# Patient Record
Sex: MALE | Race: WHITE | Employment: FULL TIME | URBAN - NONMETROPOLITAN AREA
[De-identification: names, ages, dates, MRNs, and addresses within clinical notes are randomized per-mention and may not be internally consistent; named-entity substitution may affect disease eponyms.]

---

## 2023-03-01 ENCOUNTER — OFFICE VISIT (OUTPATIENT)
Dept: FAMILY MEDICINE CLINIC | Age: 33
End: 2023-03-01
Payer: COMMERCIAL

## 2023-03-01 ENCOUNTER — TELEPHONE (OUTPATIENT)
Dept: FAMILY MEDICINE CLINIC | Age: 33
End: 2023-03-01

## 2023-03-01 VITALS
WEIGHT: 257.4 LBS | HEART RATE: 95 BPM | DIASTOLIC BLOOD PRESSURE: 92 MMHG | TEMPERATURE: 98.9 F | BODY MASS INDEX: 36.85 KG/M2 | OXYGEN SATURATION: 98 % | HEIGHT: 70 IN | SYSTOLIC BLOOD PRESSURE: 132 MMHG

## 2023-03-01 DIAGNOSIS — M25.40 JOINT SWELLING: ICD-10-CM

## 2023-03-01 DIAGNOSIS — I10 PRIMARY HYPERTENSION: ICD-10-CM

## 2023-03-01 DIAGNOSIS — R51.9 ACUTE NONINTRACTABLE HEADACHE, UNSPECIFIED HEADACHE TYPE: ICD-10-CM

## 2023-03-01 DIAGNOSIS — I10 PRIMARY HYPERTENSION: Primary | ICD-10-CM

## 2023-03-01 DIAGNOSIS — L60.8 PITTING OF NAILS: ICD-10-CM

## 2023-03-01 PROBLEM — M77.9 INFLAMMATION AROUND JOINT: Status: ACTIVE | Noted: 2023-03-01

## 2023-03-01 PROBLEM — G43.909 MIGRAINES: Status: ACTIVE | Noted: 2023-03-01

## 2023-03-01 LAB
ALBUMIN SERPL-MCNC: 4.7 G/DL (ref 3.5–4.6)
ALP BLD-CCNC: 95 U/L (ref 35–104)
ALT SERPL-CCNC: 39 U/L (ref 0–41)
ANION GAP SERPL CALCULATED.3IONS-SCNC: 18 MEQ/L (ref 9–15)
AST SERPL-CCNC: 30 U/L (ref 0–40)
BASOPHILS ABSOLUTE: 0 K/UL (ref 0–0.2)
BASOPHILS RELATIVE PERCENT: 0.3 %
BILIRUB SERPL-MCNC: 0.4 MG/DL (ref 0.2–0.7)
BUN BLDV-MCNC: 8 MG/DL (ref 6–20)
CALCIUM SERPL-MCNC: 9.9 MG/DL (ref 8.5–9.9)
CHLORIDE BLD-SCNC: 103 MEQ/L (ref 95–107)
CO2: 22 MEQ/L (ref 20–31)
CREAT SERPL-MCNC: 0.77 MG/DL (ref 0.7–1.2)
EOSINOPHILS ABSOLUTE: 0.2 K/UL (ref 0–0.7)
EOSINOPHILS RELATIVE PERCENT: 3.5 %
GFR SERPL CREATININE-BSD FRML MDRD: >60 ML/MIN/{1.73_M2}
GLOBULIN: 3.4 G/DL (ref 2.3–3.5)
GLUCOSE BLD-MCNC: 88 MG/DL (ref 70–99)
HCT VFR BLD CALC: 45.8 % (ref 42–52)
HEMOGLOBIN: 15.5 G/DL (ref 14–18)
LYMPHOCYTES ABSOLUTE: 2 K/UL (ref 1–4.8)
LYMPHOCYTES RELATIVE PERCENT: 29.2 %
MCH RBC QN AUTO: 29.3 PG (ref 27–31.3)
MCHC RBC AUTO-ENTMCNC: 33.9 % (ref 33–37)
MCV RBC AUTO: 86.3 FL (ref 79–92.2)
MONOCYTES ABSOLUTE: 0.5 K/UL (ref 0.2–0.8)
MONOCYTES RELATIVE PERCENT: 6.7 %
NEUTROPHILS ABSOLUTE: 4 K/UL (ref 1.4–6.5)
NEUTROPHILS RELATIVE PERCENT: 60.3 %
PDW BLD-RTO: 13.8 % (ref 11.5–14.5)
PLATELET # BLD: 326 K/UL (ref 130–400)
POTASSIUM SERPL-SCNC: 3.9 MEQ/L (ref 3.4–4.9)
RBC # BLD: 5.3 M/UL (ref 4.7–6.1)
SEDIMENTATION RATE, ERYTHROCYTE: 15 MM (ref 0–10)
SODIUM BLD-SCNC: 143 MEQ/L (ref 135–144)
TOTAL PROTEIN: 8.1 G/DL (ref 6.3–8)
TSH REFLEX: 1.19 UIU/ML (ref 0.44–3.86)
URIC ACID, SERUM: 7.1 MG/DL (ref 3.4–7)
WBC # BLD: 6.7 K/UL (ref 4.8–10.8)

## 2023-03-01 PROCEDURE — 3075F SYST BP GE 130 - 139MM HG: CPT | Performed by: FAMILY MEDICINE

## 2023-03-01 PROCEDURE — 99204 OFFICE O/P NEW MOD 45 MIN: CPT | Performed by: FAMILY MEDICINE

## 2023-03-01 PROCEDURE — 3080F DIAST BP >= 90 MM HG: CPT | Performed by: FAMILY MEDICINE

## 2023-03-01 RX ORDER — MULTIVITAMIN/IRON/FOLIC ACID 18MG-0.4MG
TABLET ORAL
COMMUNITY

## 2023-03-01 RX ORDER — MELATONIN 3 MG
TABLET ORAL
COMMUNITY

## 2023-03-01 RX ORDER — METOPROLOL SUCCINATE 50 MG/1
50 TABLET, EXTENDED RELEASE ORAL NIGHTLY
Qty: 30 TABLET | Refills: 5 | Status: SHIPPED | OUTPATIENT
Start: 2023-03-01

## 2023-03-01 SDOH — ECONOMIC STABILITY: HOUSING INSECURITY
IN THE LAST 12 MONTHS, WAS THERE A TIME WHEN YOU DID NOT HAVE A STEADY PLACE TO SLEEP OR SLEPT IN A SHELTER (INCLUDING NOW)?: NO

## 2023-03-01 SDOH — ECONOMIC STABILITY: INCOME INSECURITY: HOW HARD IS IT FOR YOU TO PAY FOR THE VERY BASICS LIKE FOOD, HOUSING, MEDICAL CARE, AND HEATING?: NOT HARD AT ALL

## 2023-03-01 SDOH — ECONOMIC STABILITY: FOOD INSECURITY: WITHIN THE PAST 12 MONTHS, THE FOOD YOU BOUGHT JUST DIDN'T LAST AND YOU DIDN'T HAVE MONEY TO GET MORE.: NEVER TRUE

## 2023-03-01 SDOH — ECONOMIC STABILITY: FOOD INSECURITY: WITHIN THE PAST 12 MONTHS, YOU WORRIED THAT YOUR FOOD WOULD RUN OUT BEFORE YOU GOT MONEY TO BUY MORE.: NEVER TRUE

## 2023-03-01 ASSESSMENT — PATIENT HEALTH QUESTIONNAIRE - PHQ9
SUM OF ALL RESPONSES TO PHQ QUESTIONS 1-9: 0
1. LITTLE INTEREST OR PLEASURE IN DOING THINGS: 0
SUM OF ALL RESPONSES TO PHQ QUESTIONS 1-9: 0
SUM OF ALL RESPONSES TO PHQ QUESTIONS 1-9: 0
2. FEELING DOWN, DEPRESSED OR HOPELESS: 0
SUM OF ALL RESPONSES TO PHQ QUESTIONS 1-9: 0
SUM OF ALL RESPONSES TO PHQ9 QUESTIONS 1 & 2: 0

## 2023-03-01 ASSESSMENT — ENCOUNTER SYMPTOMS
VOMITING: 0
ABDOMINAL PAIN: 0
NAUSEA: 1
COLOR CHANGE: 1
PHOTOPHOBIA: 0
CONSTIPATION: 0
ABDOMINAL DISTENTION: 0
CHEST TIGHTNESS: 0
SHORTNESS OF BREATH: 0

## 2023-03-01 NOTE — PATIENT INSTRUCTIONS
Patient will bring home blood pressure monitor to next appointment to verify accuracy. Labs ordered today looking for medical conditions causative for elevated blood pressure    Initiate medication today for elevated blood pressure as this has been historically present off and on for the last 3 years. We will discuss weight loss at next appointment. Consider migraine medications after we see the response of headaches to the blood pressure levels. Inflammatory markers and rheumatic disease and psoriatic and gout related labs are being ordered to evaluate pitting nails, swollen joints. Nail changes.     Discussed treatment options at next appointment

## 2023-03-01 NOTE — PROGRESS NOTES
Diagnosis Orders   1. Primary hypertension  Comprehensive Metabolic Panel    CBC with Auto Differential    TSH with Reflex    metoprolol succinate (TOPROL XL) 50 MG extended release tablet      2. Pitting of nails  Sedimentation Rate    CHRISTEN Screen With Reflex    Hla-B27 Antigen      3. Joint swelling  Sedimentation Rate    CHRISTEN Screen With Reflex    Rheumatoid Factor    Uric Acid      4. Acute nonintractable headache, unspecified headache type          Return in about 1 week (around 3/8/2023) for for review of outcome of today's recommendation. Patient Instructions   Patient will bring home blood pressure monitor to next appointment to verify accuracy. Labs ordered today looking for medical conditions causative for elevated blood pressure    Initiate medication today for elevated blood pressure as this has been historically present off and on for the last 3 years. We will discuss weight loss at next appointment. Consider migraine medications after we see the response of headaches to the blood pressure levels. Inflammatory markers and rheumatic disease and psoriatic and gout related labs are being ordered to evaluate pitting nails, swollen joints. Nail changes. Discussed treatment options at next appointment    Subjective:      Patient ID: Ever Avendano is a 28 y.o. male who presents for:  Chief Complaint   Patient presents with    Establish Care     Establish care     Migraine    Joint Pain     With inflammation around knuckles and toes        Bp elevations for a while. Migraines since at least 2019. Tylenol and ibuprofen no help, ice pack some help. Headache is often one sided. Has had blurred vision with them. Sleep usually helps. Does snore and can wake with headache. Over the counter migraine med with sleep that does help, but can occur. Rare alcohol. Coffee 10 oz in morning and some diet coke- 2 during day. Monitoring hydration and taking in water routinely.       Noting swelling in the joints of the fingers ankles and toes. Noting color changes in fingernails and pitting. Current Outpatient Medications on File Prior to Visit   Medication Sig Dispense Refill    Multiple Vitamins-Minerals (CENTRUM ADULTS) TABS       Cetirizine HCl 10 MG CAPS Take 1 capsule by mouth daily      Melatonin 3-10 MG TABS        No current facility-administered medications on file prior to visit. Past Medical History:   Diagnosis Date    Anxiety     Panic Attacks    Headache     Migraines    Hypertension      History reviewed. No pertinent surgical history. Social History     Socioeconomic History    Marital status: Single     Spouse name: Not on file    Number of children: Not on file    Years of education: Not on file    Highest education level: Not on file   Occupational History    Not on file   Tobacco Use    Smoking status: Former    Smokeless tobacco: Never   Vaping Use    Vaping Use: Not on file   Substance and Sexual Activity    Alcohol use: Yes     Comment: On occasion not regularly    Drug use: Never    Sexual activity: Yes     Partners: Male   Other Topics Concern    Not on file   Social History Narrative    Not on file     Social Determinants of Health     Financial Resource Strain: Low Risk     Difficulty of Paying Living Expenses: Not hard at all   Food Insecurity: No Food Insecurity    Worried About Running Out of Food in the Last Year: Never true    [address] in the Last Year: Never true   Transportation Needs: Unknown    Lack of Transportation (Medical): Not on file    Lack of Transportation (Non-Medical):  No   Physical Activity: Not on file   Stress: Not on file   Social Connections: Not on file   Intimate Partner Violence: Not on file   Housing Stability: Unknown    Unable to Pay for Housing in the Last Year: Not on file    Number of Places Lived in the Last Year: Not on file    Unstable Housing in the Last Year: No     Family History   Problem Relation Age of Onset    Asthma Mother     Heart Attack Father     High Blood Pressure Father     Cancer Maternal Grandfather         Skin and SCLC    Heart Attack Maternal Grandfather     Prostate Cancer Maternal Grandfather     Allergy (Severe) Maternal Grandmother         Iodine, shellfish    Cancer Paternal Grandfather         Throat    Stroke Paternal Grandmother     Arthritis Sister         Rheumatoid    Spondylitis Sister         ankylosing    Asthma Sister     Lupus Brother     Mental Retardation Maternal Cousin         Down Syndrome     Allergies:  Seasonal    Review of Systems   Constitutional:  Negative for activity change, appetite change, diaphoresis and unexpected weight change. Eyes:  Negative for photophobia and visual disturbance. Respiratory:  Negative for chest tightness and shortness of breath. No orthopnea   Cardiovascular:  Negative for chest pain, palpitations and leg swelling. Gastrointestinal:  Positive for nausea. Negative for abdominal distention, abdominal pain, constipation and vomiting. Genitourinary:  Negative for flank pain and frequency. Musculoskeletal:  Negative for gait problem and joint swelling. Skin:  Positive for color change. Neurological:  Positive for headaches. Negative for dizziness, weakness and light-headedness. Psychiatric/Behavioral:  Negative for confusion. Objective:   BP (!) 132/92   Pulse 95   Temp 98.9 °F (37.2 °C)   Ht 5' 9.75\" (1.772 m)   Wt 257 lb 6.4 oz (116.8 kg)   SpO2 98%   BMI 37.20 kg/m²     Physical Exam  Vitals reviewed. Constitutional:       General: He is not in acute distress. Appearance: He is well-developed. HENT:      Head: Normocephalic and atraumatic. Right Ear: External ear normal.      Left Ear: External ear normal.      Nose: Nose normal.   Eyes:      General:         Right eye: No discharge. Left eye: No discharge.       Conjunctiva/sclera: Conjunctivae normal.      Pupils: Pupils are equal, round, and reactive to light.   Neck:      Thyroid: No thyromegaly. Cardiovascular:      Rate and Rhythm: Normal rate and regular rhythm. Heart sounds: Normal heart sounds. Pulmonary:      Effort: Pulmonary effort is normal. No respiratory distress. Breath sounds: Normal breath sounds. Abdominal:      General: There is no distension. Musculoskeletal:      Cervical back: Neck supple. Skin:     General: Skin is warm and dry. Comments: Abnormality in the nail discoloration and hyperkeratosis noted in the left hand #3 and 4 and 1, right hand #1 and 2. Pitting noted in all fingernail   Neurological:      Mental Status: He is alert and oriented to person, place, and time. Coordination: Coordination normal.   Psychiatric:         Thought Content: Thought content normal.         Judgment: Judgment normal.       No results found for this visit on 03/01/23. No results found for this or any previous visit (from the past 2016 hour(s)). [] Pt was seen by provider for      Minutes  Counseling and coordination of care was done for all assessment diagnosis listed for today with patient and any family/friend present. More than 50% of this visit was spent coordinating current care, obtaining information for prior records, and counseling for current plan of action. Assessment:       Diagnosis Orders   1. Primary hypertension  Comprehensive Metabolic Panel    CBC with Auto Differential    TSH with Reflex    metoprolol succinate (TOPROL XL) 50 MG extended release tablet      2. Pitting of nails  Sedimentation Rate    CHRISTEN Screen With Reflex    Hla-B27 Antigen      3. Joint swelling  Sedimentation Rate    CHRISTEN Screen With Reflex    Rheumatoid Factor    Uric Acid      4.  Acute nonintractable headache, unspecified headache type              Orders Placed This Encounter   Procedures    Comprehensive Metabolic Panel     Standing Status:   Future     Standing Expiration Date:   3/1/2024    CBC with Auto Differential Standing Status:   Future     Standing Expiration Date:   3/1/2024    TSH with Reflex     Standing Status:   Future     Standing Expiration Date:   3/1/2024    Sedimentation Rate     Standing Status:   Future     Standing Expiration Date:   2/29/2024    CHRISTEN Screen With Reflex     Standing Status:   Future     Standing Expiration Date:   3/1/2024    Rheumatoid Factor     Standing Status:   Future     Standing Expiration Date:   2/29/2024    Uric Acid     Standing Status:   Future     Standing Expiration Date:   2/29/2024    Hla-B27 Antigen     Standing Status:   Future     Standing Expiration Date:   3/1/2024         Orders Placed This Encounter   Medications    metoprolol succinate (TOPROL XL) 50 MG extended release tablet     Sig: Take 1 tablet by mouth nightly     Dispense:  30 tablet     Refill:  5            Medication List            Accurate as of March 1, 2023 12:22 PM. If you have any questions, ask your nurse or doctor. START taking these medications      metoprolol succinate 50 MG extended release tablet  Commonly known as: TOPROL XL  Take 1 tablet by mouth nightly  Started by: Darling Saavedra MD            CONTINUE taking these medications      Centrum Adults Tabs     Cetirizine HCl 10 MG Caps     Melatonin 3-10 MG Tabs               Where to Get Your Medications        These medications were sent to [address] [place] [address]      Phone: [phone]   metoprolol succinate 50 MG extended release tablet           Plan:   Return in about 1 week (around 3/8/2023) for for review of outcome of today's recommendation. Patient Instructions   Patient will bring home blood pressure monitor to next appointment to verify accuracy.     Labs ordered today looking for medical conditions causative for elevated blood pressure    Initiate medication today for elevated blood pressure as this has been historically present off and on for the last 3 years.    We will discuss weight loss at next appointment.    Consider migraine medications after we see the response of headaches to the blood pressure levels.    Inflammatory markers and rheumatic disease and psoriatic and gout related labs are being ordered to evaluate pitting nails, swollen joints.  Nail changes.    Discussed treatment options at next appointment    This note was partially created with the assistance of dictation.  This may lead to grammatical or spelling errors.      Gregor Kirk M.D.

## 2023-03-01 NOTE — TELEPHONE ENCOUNTER
Pt was in office today and labs ordered. Pt was with  getting labs drawn and lost consciousness for a few seconds.  called for assistance and Moisés Subramanian and I went back to assist tech and pt. Angus Kaur spoke to the pt for several min. Asking about history and symptoms. Vitals were low BP 87/48 with automatic cuff, pulse 55. Angus Kaur helped pt to exam room via wheelchair and we laid him back to rest. Pt was originally given ice pack and water to drink and continued using ice pack while resting. Speaking to pt, he stated to Angus Kaur with elevated BP during visit and HX, Dr Jaime Manley prescribed Metoprolol XL 50. Before releasing pt, manual BP taken and was 118/80. Pulse back to normal in the 80s. Angus Kaur spoke to Dr Jaime Manley and pt was to start script as discussed but to make sure he gives himself plenty of time to recoup from syncope episode. Pt left on his on feeling much better.

## 2023-03-04 LAB
ANA IGG, ELISA: DETECTED
HLA B27: NORMAL
RHEUMATOID FACTOR: 14 IU/ML

## 2023-03-06 LAB
ANTINUCLEAR AB INTERPRETIVE COMMENT: NORMAL
ANTINUCLEAR ANTIBODY, HEP-2, IGG: NORMAL

## 2023-03-08 ENCOUNTER — OFFICE VISIT (OUTPATIENT)
Dept: FAMILY MEDICINE CLINIC | Age: 33
End: 2023-03-08
Payer: COMMERCIAL

## 2023-03-08 VITALS
HEIGHT: 70 IN | TEMPERATURE: 98.7 F | BODY MASS INDEX: 36.79 KG/M2 | SYSTOLIC BLOOD PRESSURE: 122 MMHG | OXYGEN SATURATION: 98 % | WEIGHT: 257 LBS | HEART RATE: 72 BPM | DIASTOLIC BLOOD PRESSURE: 86 MMHG

## 2023-03-08 DIAGNOSIS — F41.9 ANXIETY: ICD-10-CM

## 2023-03-08 DIAGNOSIS — M25.40 JOINT SWELLING: ICD-10-CM

## 2023-03-08 DIAGNOSIS — I10 PRIMARY HYPERTENSION: ICD-10-CM

## 2023-03-08 DIAGNOSIS — R76.8 RHEUMATOID FACTOR POSITIVE: ICD-10-CM

## 2023-03-08 DIAGNOSIS — L60.8 PITTING OF NAILS: ICD-10-CM

## 2023-03-08 DIAGNOSIS — E79.0 ELEVATED URIC ACID IN BLOOD: ICD-10-CM

## 2023-03-08 DIAGNOSIS — R09.81 NASAL CONGESTION: Primary | ICD-10-CM

## 2023-03-08 DIAGNOSIS — R51.9 ACUTE NONINTRACTABLE HEADACHE, UNSPECIFIED HEADACHE TYPE: ICD-10-CM

## 2023-03-08 PROCEDURE — 3079F DIAST BP 80-89 MM HG: CPT | Performed by: FAMILY MEDICINE

## 2023-03-08 PROCEDURE — 3074F SYST BP LT 130 MM HG: CPT | Performed by: FAMILY MEDICINE

## 2023-03-08 PROCEDURE — 99214 OFFICE O/P EST MOD 30 MIN: CPT | Performed by: FAMILY MEDICINE

## 2023-03-08 RX ORDER — AZELASTINE 1 MG/ML
1 SPRAY, METERED NASAL 2 TIMES DAILY
Qty: 30 ML | Refills: 2 | Status: SHIPPED | OUTPATIENT
Start: 2023-03-08

## 2023-03-08 RX ORDER — BUSPIRONE HYDROCHLORIDE 15 MG/1
TABLET ORAL
Qty: 30 TABLET | Refills: 1 | Status: SHIPPED | OUTPATIENT
Start: 2023-03-08

## 2023-03-08 ASSESSMENT — ENCOUNTER SYMPTOMS
PHOTOPHOBIA: 0
ABDOMINAL PAIN: 0
ABDOMINAL DISTENTION: 0
RHINORRHEA: 0
BACK PAIN: 1
CHEST TIGHTNESS: 0
SORE THROAT: 0
SHORTNESS OF BREATH: 0

## 2023-03-08 NOTE — PATIENT INSTRUCTIONS
Reviewed patient's positive laboratories and will refer to rheumatology. At this point no treatment for the elevated uric acid but will continue to monitor. New nasal congestion will be treated with Astelin due to reflecting mostly vascular congestion. Patient's headache will continue to be monitored. Not a lot of change with metoprolol but the blood pressures only been controlled within a week to 2 weeks. We will add BuSpar as needed for anxiety or insomnia. Follow-up appointment in 1 month to rediscuss control of headaches and congestion    No action regarding elevated uric acid yet at this time. Continue to monitor. Continue current hypertensive medication dosage.

## 2023-03-08 NOTE — PROGRESS NOTES
Diagnosis Orders   1. Nasal congestion  azelastine (ASTELIN) 0.1 % nasal spray      2. Rheumatoid factor positive  Amb External Referral To Rheumatology      3. Joint swelling  Amb External Referral To Rheumatology      4. Anxiety  busPIRone (BUSPAR) 15 MG tablet      5. Pitting of nails  Amb External Referral To Rheumatology      6. Primary hypertension        7. Acute nonintractable headache, unspecified headache type        8. Elevated uric acid in blood          Return in about 4 weeks (around 4/5/2023) for for review of outcome of today's recommendation. Patient Instructions   Reviewed patient's positive laboratories and will refer to rheumatology. At this point no treatment for the elevated uric acid but will continue to monitor. New nasal congestion will be treated with Astelin due to reflecting mostly vascular congestion. Patient's headache will continue to be monitored. Not a lot of change with metoprolol but the blood pressures only been controlled within a week to 2 weeks. We will add BuSpar as needed for anxiety or insomnia. Follow-up appointment in 1 month to rediscuss control of headaches and congestion    No action regarding elevated uric acid yet at this time. Continue to monitor. Continue current hypertensive medication dosage. Subjective:      Patient ID: Lindsay Humphrey is a 28 y.o. male who presents for:  Chief Complaint   Patient presents with    Hypertension     X 1 week follow up        Patient here for follow-up of blood pressure, labs, headaches. Patient notes decreasing blood pressure at home. Cuff was reading in the normal range prior to coming here. Where the measurement was slightly higher. Yet our measurements were normal.    Patient states he has had 3 regular headaches this week and 1 migraine. Feeling anxious. Has never taken anything for anxiety and needs to have a clear head due to the type of job that he has.     Reviewed postures and positions that can cause body pain headaches etc.  Patient is in front of a computer sitting a lot. States he does feel somewhat better when he has aches or pains and get up and walk around. Reviewed rheumatologic labs etc.    Pt has stuffy nose without drainage, cough or fever. No meds tried      Current Outpatient Medications on File Prior to Visit   Medication Sig Dispense Refill    Multiple Vitamins-Minerals (CENTRUM ADULTS) TABS       Cetirizine HCl 10 MG CAPS Take 1 capsule by mouth daily      Melatonin 3-10 MG TABS       metoprolol succinate (TOPROL XL) 50 MG extended release tablet Take 1 tablet by mouth nightly 30 tablet 5     No current facility-administered medications on file prior to visit. Past Medical History:   Diagnosis Date    Anxiety     Panic Attacks    Headache     Migraines    Hypertension      History reviewed. No pertinent surgical history. Social History     Socioeconomic History    Marital status: Single     Spouse name: Not on file    Number of children: Not on file    Years of education: Not on file    Highest education level: Not on file   Occupational History    Not on file   Tobacco Use    Smoking status: Former    Smokeless tobacco: Never   Vaping Use    Vaping Use: Not on file   Substance and Sexual Activity    Alcohol use: Yes     Comment: On occasion not regularly    Drug use: Never    Sexual activity: Yes     Partners: Male   Other Topics Concern    Not on file   Social History Narrative    Not on file     Social Determinants of Health     Financial Resource Strain: Low Risk     Difficulty of Paying Living Expenses: Not hard at all   Food Insecurity: No Food Insecurity    Worried About Running Out of Food in the Last Year: Never true    [address] in the Last Year: Never true   Transportation Needs: Unknown    Lack of Transportation (Medical): Not on file    Lack of Transportation (Non-Medical):  No   Physical Activity: Not on file   Stress: Not on file   Social Connections: Not on file   Intimate Partner Violence: Not on file   Housing Stability: Unknown    Unable to Pay for Housing in the Last Year: Not on file    Number of [place] in the Last Year: Not on file    Unstable Housing in the Last Year: No     Family History   Problem Relation Age of Onset    Asthma Mother     Heart Attack Father     High Blood Pressure Father     Cancer Maternal Grandfather         Skin and SCLC    Heart Attack Maternal Grandfather     Prostate Cancer Maternal Grandfather     Allergy (Severe) Maternal Grandmother         Iodine, shellfish    Cancer Paternal Grandfather         Throat    Stroke Paternal Grandmother     Arthritis Sister         Rheumatoid    Spondylitis Sister         ankylosing    Asthma Sister     Lupus Brother     Mental Retardation Maternal Cousin         Down Syndrome     Allergies:  Seasonal    Review of Systems   Constitutional:  Negative for activity change, appetite change, diaphoresis and unexpected weight change. HENT:  Positive for congestion. Negative for postnasal drip, rhinorrhea and sore throat. Eyes:  Negative for photophobia and visual disturbance. Respiratory:  Negative for chest tightness and shortness of breath. No orthopnea   Cardiovascular:  Negative for chest pain, palpitations and leg swelling. Gastrointestinal:  Negative for abdominal distention and abdominal pain. Genitourinary:  Negative for flank pain and frequency. Musculoskeletal:  Positive for back pain and joint swelling. Negative for gait problem. Neurological:  Positive for headaches. Negative for dizziness, weakness and light-headedness. Psychiatric/Behavioral:  Negative for confusion. Objective:   /86 Comment: 142/95  Pulse 72   Temp 98.7 °F (37.1 °C)   Ht 5' 9.75\" (1.772 m)   Wt 257 lb (116.6 kg)   SpO2 98%   BMI 37.14 kg/m²     Physical Exam  Vitals reviewed. Constitutional:       General: He is not in acute distress.      Appearance: He is well-developed. HENT:      Head: Normocephalic and atraumatic. Right Ear: External ear normal.      Left Ear: External ear normal.      Nose: Nose normal.   Eyes:      General:         Right eye: No discharge. Left eye: No discharge. Conjunctiva/sclera: Conjunctivae normal.      Pupils: Pupils are equal, round, and reactive to light. Neck:      Thyroid: No thyromegaly. Cardiovascular:      Rate and Rhythm: Normal rate and regular rhythm. Pulmonary:      Effort: Pulmonary effort is normal. No respiratory distress. Abdominal:      General: There is no distension. Musculoskeletal:      Cervical back: Neck supple. Skin:     General: Skin is warm and dry. Neurological:      Mental Status: He is alert and oriented to person, place, and time. Coordination: Coordination normal.   Psychiatric:         Thought Content: Thought content normal.         Judgment: Judgment normal.       No results found for this visit on 03/08/23.     Recent Results (from the past 2016 hour(s))   Hla-B27 Antigen    Collection Time: 03/01/23 12:56 PM   Result Value Ref Range    HLA B27 See Note Negative   Uric Acid    Collection Time: 03/01/23 12:56 PM   Result Value Ref Range    Uric Acid, Serum 7.1 (H) 3.4 - 7.0 mg/dL   Rheumatoid Factor    Collection Time: 03/01/23 12:56 PM   Result Value Ref Range    Rheumatoid Factor 14.0 (H) <14 IU/mL   CHRISTEN Screen With Reflex    Collection Time: 03/01/23 12:56 PM   Result Value Ref Range    CHRISTEN Ab, IgG BENJAMÍN Detected (A) None Detected   Sedimentation Rate    Collection Time: 03/01/23 12:56 PM   Result Value Ref Range    Sed Rate 15 (H) 0 - 10 mm   TSH with Reflex    Collection Time: 03/01/23 12:56 PM   Result Value Ref Range    TSH 1.190 0.440 - 3.860 uIU/mL   CBC with Auto Differential    Collection Time: 03/01/23 12:56 PM   Result Value Ref Range    WBC 6.7 4.8 - 10.8 K/uL    RBC 5.30 4.70 - 6.10 M/uL    Hemoglobin 15.5 14.0 - 18.0 g/dL    Hematocrit 45.8 42.0 - 52.0 % MCV 86.3 79.0 - 92.2 fL    MCH 29.3 27.0 - 31.3 pg    MCHC 33.9 33.0 - 37.0 %    RDW 13.8 11.5 - 14.5 %    Platelets 590 227 - 029 K/uL    Neutrophils % 60.3 %    Lymphocytes % 29.2 %    Monocytes % 6.7 %    Eosinophils % 3.5 %    Basophils % 0.3 %    Neutrophils Absolute 4.0 1.4 - 6.5 K/uL    Lymphocytes Absolute 2.0 1.0 - 4.8 K/uL    Monocytes Absolute 0.5 0.2 - 0.8 K/uL    Eosinophils Absolute 0.2 0.0 - 0.7 K/uL    Basophils Absolute 0.0 0.0 - 0.2 K/uL   Comprehensive Metabolic Panel    Collection Time: 03/01/23 12:56 PM   Result Value Ref Range    Sodium 143 135 - 144 mEq/L    Potassium 3.9 3.4 - 4.9 mEq/L    Chloride 103 95 - 107 mEq/L    CO2 22 20 - 31 mEq/L    Anion Gap 18 (H) 9 - 15 mEq/L    Glucose 88 70 - 99 mg/dL    BUN 8 6 - 20 mg/dL    Creatinine 0.77 0.70 - 1.20 mg/dL    Est, Glom Filt Rate >60.0 >60    Calcium 9.9 8.5 - 9.9 mg/dL    Total Protein 8.1 (H) 6.3 - 8.0 g/dL    Albumin 4.7 (H) 3.5 - 4.6 g/dL    Total Bilirubin 0.4 0.2 - 0.7 mg/dL    Alkaline Phosphatase 95 35 - 104 U/L    ALT 39 0 - 41 U/L    AST 30 0 - 40 U/L    Globulin 3.4 2.3 - 3.5 g/dL   Antinuclear AB, Hep-2, IGG    Collection Time: 03/01/23 12:56 PM   Result Value Ref Range    Antinuclear Antibody, Hep-2, IGG <1:80 <1:80    Antinuclear AB Interpretive Comment See Note        [] Pt was seen by provider for      Minutes  Counseling and coordination of care was done for all assessment diagnosis listed for today with patient and any family/friend present. More than 50% of this visit was spent coordinating current care, obtaining information for prior records, and counseling for current plan of action. Assessment:       Diagnosis Orders   1. Nasal congestion  azelastine (ASTELIN) 0.1 % nasal spray      2. Rheumatoid factor positive  Amb External Referral To Rheumatology      3. Joint swelling  Amb External Referral To Rheumatology      4. Anxiety  busPIRone (BUSPAR) 15 MG tablet      5.  Pitting of nails  Amb External Referral To Rheumatology      6. Primary hypertension        7. Acute nonintractable headache, unspecified headache type        8. Elevated uric acid in blood              Orders Placed This Encounter   Procedures    Amb External Referral To Rheumatology     Referral Priority:   Routine     Referral Type:   Eval and Treat     Referral Reason:   Specialty Services Required     Referred to Provider:   Natalia Thomas MD     Requested Specialty:   Rheumatology     Number of Visits Requested:   1       Orders Placed This Encounter   Medications    azelastine (ASTELIN) 0.1 % nasal spray     Si spray by Nasal route 2 times daily Use in each nostril as directed     Dispense:  30 mL     Refill:  2    busPIRone (BUSPAR) 15 MG tablet     Sig: Can take anywhere from one third of a tablet to 1 whole tablet up to 3 times daily as needed for anxiety or insomnia     Dispense:  30 tablet     Refill:  1          Medication List            Accurate as of 2023  4:48 PM. If you have any questions, ask your nurse or doctor.                 START taking these medications      azelastine 0.1 % nasal spray  Commonly known as: ASTELIN  1 spray by Nasal route 2 times daily Use in each nostril as directed  Started by: Matthew Hall MD     busPIRone 15 MG tablet  Commonly known as: BUSPAR  Can take anywhere from one third of a tablet to 1 whole tablet up to 3 times daily as needed for anxiety or insomnia  Started by: Matthew Hall MD            CONTINUE taking these medications      Centrum Adults Tabs     Cetirizine HCl 10 MG Caps     Melatonin 3-10 MG Tabs     metoprolol succinate 50 MG extended release tablet  Commonly known as: TOPROL XL  Take 1 tablet by mouth nightly               Where to Get Your Medications        These medications were sent to [address] [place] [address], [facility]      Phone: [phone]   azelastine 0.1 % nasal spray  busPIRone 15 MG tablet           Plan:   Return in about 4 weeks (around 4/5/2023) for for review of outcome of today's recommendation. Patient Instructions   Reviewed patient's positive laboratories and will refer to rheumatology. At this point no treatment for the elevated uric acid but will continue to monitor. New nasal congestion will be treated with Astelin due to reflecting mostly vascular congestion. Patient's headache will continue to be monitored. Not a lot of change with metoprolol but the blood pressures only been controlled within a week to 2 weeks. We will add BuSpar as needed for anxiety or insomnia. Follow-up appointment in 1 month to rediscuss control of headaches and congestion    No action regarding elevated uric acid yet at this time. Continue to monitor. Continue current hypertensive medication dosage. This note was partially created with the assistance of dictation. This may lead to grammatical or spelling errors. Gregor Boss M.D.

## 2023-05-12 DIAGNOSIS — F41.9 ANXIETY: ICD-10-CM

## 2023-05-12 DIAGNOSIS — I10 PRIMARY HYPERTENSION: ICD-10-CM

## 2023-05-12 NOTE — TELEPHONE ENCOUNTER
Future Appointments    This patient does not currently have any appointments scheduled.   Past Visits    Date Provider Specialty Visit Type Primary Dx   03/08/2023 Sobia Anderson MD Family Medicine Office Visit Nasal congestion   03/01/2023 Sobia Anderson MD Family Medicine Office Visit Primary hypertension

## 2023-05-13 RX ORDER — BUSPIRONE HYDROCHLORIDE 15 MG/1
TABLET ORAL
Qty: 30 TABLET | Refills: 0 | Status: SHIPPED | OUTPATIENT
Start: 2023-05-13

## 2023-05-13 RX ORDER — METOPROLOL SUCCINATE 50 MG/1
50 TABLET, EXTENDED RELEASE ORAL NIGHTLY
Qty: 30 TABLET | Refills: 0 | Status: SHIPPED | OUTPATIENT
Start: 2023-05-13

## 2023-06-28 DIAGNOSIS — I10 PRIMARY HYPERTENSION: ICD-10-CM

## 2023-06-29 DIAGNOSIS — F41.9 ANXIETY: ICD-10-CM

## 2023-06-29 DIAGNOSIS — I10 PRIMARY HYPERTENSION: ICD-10-CM

## 2023-06-29 RX ORDER — BUSPIRONE HYDROCHLORIDE 15 MG/1
TABLET ORAL
Qty: 30 TABLET | Refills: 0 | Status: SHIPPED | OUTPATIENT
Start: 2023-06-29

## 2023-06-29 RX ORDER — METOPROLOL SUCCINATE 50 MG/1
50 TABLET, EXTENDED RELEASE ORAL NIGHTLY
Qty: 30 TABLET | Refills: 0 | Status: SHIPPED | OUTPATIENT
Start: 2023-06-29

## 2023-06-29 RX ORDER — METOPROLOL SUCCINATE 50 MG/1
50 TABLET, EXTENDED RELEASE ORAL NIGHTLY
Qty: 30 TABLET | Refills: 0 | Status: CANCELLED | OUTPATIENT
Start: 2023-06-29

## 2023-06-29 NOTE — TELEPHONE ENCOUNTER
Future Appointments    Encounter Information    Provider Department Appt Notes   7/6/2023 Kinga Centeno MD [facility]      Past Visits    Date Provider Specialty Visit Type Primary Dx   03/08/2023 Kinga Centeno MD Family Medicine Office Visit Nasal congestion   03/01/2023 Kinga Centeno MD Family Medicine Office Visit Primary hypertension

## 2023-07-06 ENCOUNTER — OFFICE VISIT (OUTPATIENT)
Dept: FAMILY MEDICINE CLINIC | Age: 33
End: 2023-07-06
Payer: COMMERCIAL

## 2023-07-06 VITALS
OXYGEN SATURATION: 97 % | DIASTOLIC BLOOD PRESSURE: 86 MMHG | BODY MASS INDEX: 37.94 KG/M2 | HEIGHT: 70 IN | HEART RATE: 78 BPM | TEMPERATURE: 98.7 F | SYSTOLIC BLOOD PRESSURE: 128 MMHG | WEIGHT: 265 LBS

## 2023-07-06 DIAGNOSIS — I10 PRIMARY HYPERTENSION: ICD-10-CM

## 2023-07-06 DIAGNOSIS — R07.9 CHEST PAIN, UNSPECIFIED TYPE: ICD-10-CM

## 2023-07-06 DIAGNOSIS — R06.83 SNORING: Primary | ICD-10-CM

## 2023-07-06 DIAGNOSIS — R76.8 RHEUMATOID FACTOR POSITIVE: ICD-10-CM

## 2023-07-06 DIAGNOSIS — R51.9 ACUTE NONINTRACTABLE HEADACHE, UNSPECIFIED HEADACHE TYPE: ICD-10-CM

## 2023-07-06 DIAGNOSIS — G47.00 FREQUENT NOCTURNAL AWAKENING: ICD-10-CM

## 2023-07-06 PROCEDURE — 3079F DIAST BP 80-89 MM HG: CPT | Performed by: FAMILY MEDICINE

## 2023-07-06 PROCEDURE — 3074F SYST BP LT 130 MM HG: CPT | Performed by: FAMILY MEDICINE

## 2023-07-06 PROCEDURE — 99215 OFFICE O/P EST HI 40 MIN: CPT | Performed by: FAMILY MEDICINE

## 2023-07-06 RX ORDER — METOPROLOL SUCCINATE 100 MG/1
100 TABLET, EXTENDED RELEASE ORAL NIGHTLY
Qty: 30 TABLET | Refills: 0 | Status: SHIPPED | OUTPATIENT
Start: 2023-07-06

## 2023-07-06 RX ORDER — SUMATRIPTAN 50 MG/1
TABLET, FILM COATED ORAL
Qty: 9 TABLET | Refills: 3 | Status: SHIPPED | OUTPATIENT
Start: 2023-07-06

## 2023-07-06 RX ORDER — HYDROXYCHLOROQUINE SULFATE 200 MG/1
200 TABLET, FILM COATED ORAL 2 TIMES DAILY
COMMUNITY

## 2023-07-06 NOTE — PROGRESS NOTES
Diagnosis Orders   1. Snoring  Sleep Study with PAP Titration      2. Acute nonintractable headache, unspecified headache type  SUMAtriptan (IMITREX) 50 MG tablet      3. Primary hypertension  metoprolol succinate (TOPROL XL) 100 MG extended release tablet      4. Frequent nocturnal awakening  Sleep Study with PAP Titration      5. Rheumatoid factor positive        6. Chest pain, unspecified type          Return in about 2 weeks (around 7/20/2023) for for review of outcome of today's recommendation. Patient Instructions   Patient will do a trial of Imitrex. He can take 50 mg at the onset of headache, which means if he wakes up with a headache he will take it right away, and then repeat the dose 2 hours later if the headache is not completely gone. Increasing metoprolol to help control blood pressure and possibly prevent headaches. Due to recent history revealing snoring, frequent nighttime awakenings we will order sleep study to see if trigger for migraines and elevated blood pressure is actually sleep apnea. Patient will continue to follow with rheumatology regarding rheumatologic/psoriatic arthritis findings. Subjective:      Patient ID: Cecilia Whittington is a 28 y.o. male who presents for:  Chief Complaint   Patient presents with    Discuss Medications    Headache       Humberto ankles, toes, L. R wrist feels stiff for hours and does get better by the afternoon. Was started on Hydroxychloroquine by Rheumatology. They think he has psoriatic arthritis. Bp crept up with diclofenac. Headaches at least 2 times per week. Often on the R but can be bilateral and getting into his neck. No weakness, tremor. Waking in the middle of the night to use the restroom which is unusual.      Pt snores.        Current Outpatient Medications on File Prior to Visit   Medication Sig Dispense Refill    hydroxychloroquine (PLAQUENIL) 200 MG tablet Take 1 tablet by mouth 2 times daily      busPIRone (BUSPAR) 15 MG

## 2023-07-06 NOTE — PATIENT INSTRUCTIONS
Patient will do a trial of Imitrex. He can take 50 mg at the onset of headache, which means if he wakes up with a headache he will take it right away, and then repeat the dose 2 hours later if the headache is not completely gone. Increasing metoprolol to help control blood pressure and possibly prevent headaches. Due to recent history revealing snoring, frequent nighttime awakenings we will order sleep study to see if trigger for migraines and elevated blood pressure is actually sleep apnea. Patient will continue to follow with rheumatology regarding rheumatologic/psoriatic arthritis findings.

## 2023-07-17 DIAGNOSIS — F41.9 ANXIETY: ICD-10-CM

## 2023-07-17 DIAGNOSIS — I10 PRIMARY HYPERTENSION: ICD-10-CM

## 2023-07-17 RX ORDER — METOPROLOL SUCCINATE 100 MG/1
100 TABLET, EXTENDED RELEASE ORAL NIGHTLY
Qty: 30 TABLET | Refills: 0 | Status: SHIPPED | OUTPATIENT
Start: 2023-07-17

## 2023-07-17 RX ORDER — BUSPIRONE HYDROCHLORIDE 15 MG/1
TABLET ORAL
Qty: 30 TABLET | Refills: 0 | Status: SHIPPED | OUTPATIENT
Start: 2023-07-17

## 2023-07-17 NOTE — TELEPHONE ENCOUNTER
The 100mg metoprolol was sent to pharmacy for patient on 7/11/2023 - not sure if he needs the 50 ? (Unsure if he is just unaware to have pharmacy just fill this medication)

## 2023-08-03 ENCOUNTER — OFFICE VISIT (OUTPATIENT)
Dept: FAMILY MEDICINE CLINIC | Age: 33
End: 2023-08-03
Payer: COMMERCIAL

## 2023-08-03 VITALS
HEART RATE: 74 BPM | WEIGHT: 265 LBS | TEMPERATURE: 98.9 F | BODY MASS INDEX: 37.94 KG/M2 | SYSTOLIC BLOOD PRESSURE: 128 MMHG | OXYGEN SATURATION: 97 % | DIASTOLIC BLOOD PRESSURE: 82 MMHG | HEIGHT: 70 IN

## 2023-08-03 DIAGNOSIS — F41.9 ANXIETY: ICD-10-CM

## 2023-08-03 DIAGNOSIS — G43.919 INTRACTABLE MIGRAINE WITHOUT STATUS MIGRAINOSUS, UNSPECIFIED MIGRAINE TYPE: Primary | ICD-10-CM

## 2023-08-03 DIAGNOSIS — E66.01 SEVERE OBESITY (BMI 35.0-39.9) WITH COMORBIDITY (HCC): ICD-10-CM

## 2023-08-03 DIAGNOSIS — E66.01 CLASS 2 SEVERE OBESITY DUE TO EXCESS CALORIES WITH SERIOUS COMORBIDITY AND BODY MASS INDEX (BMI) OF 35.0 TO 35.9 IN ADULT (HCC): ICD-10-CM

## 2023-08-03 DIAGNOSIS — I10 PRIMARY HYPERTENSION: ICD-10-CM

## 2023-08-03 PROCEDURE — 3079F DIAST BP 80-89 MM HG: CPT | Performed by: FAMILY MEDICINE

## 2023-08-03 PROCEDURE — 3074F SYST BP LT 130 MM HG: CPT | Performed by: FAMILY MEDICINE

## 2023-08-03 PROCEDURE — 99213 OFFICE O/P EST LOW 20 MIN: CPT | Performed by: FAMILY MEDICINE

## 2023-08-03 RX ORDER — METOPROLOL SUCCINATE 100 MG/1
100 TABLET, EXTENDED RELEASE ORAL NIGHTLY
Qty: 30 TABLET | Refills: 2 | Status: SHIPPED | OUTPATIENT
Start: 2023-08-03

## 2023-08-03 RX ORDER — BUSPIRONE HYDROCHLORIDE 15 MG/1
TABLET ORAL
Qty: 30 TABLET | Refills: 2 | Status: SHIPPED | OUTPATIENT
Start: 2023-08-03

## 2023-08-03 NOTE — PATIENT INSTRUCTIONS
Patient will continue metoprolol for blood pressure and control and migraine prevention. As needed anxiety treatment with BuSpar. As needed migraine treatment with Imitrex. Pending sleep study at this time. Follow-up appointment 3 months.

## 2023-08-03 NOTE — PROGRESS NOTES
[facility] [address]      Phone: [phone]   busPIRone 15 MG tablet  metoprolol succinate 100 MG extended release tablet           Plan:   Return in about 3 months (around 11/3/2023) for for review of outcome of today's recommendation. Patient Instructions   Patient will continue metoprolol for blood pressure and control and migraine prevention. As needed anxiety treatment with BuSpar. As needed migraine treatment with Imitrex. Pending sleep study at this time. Follow-up appointment 3 months. This note was partially created with the assistance of dictation. This may lead to grammatical or spelling errors. Gregor De La Fuente M.D.

## 2023-10-27 DIAGNOSIS — I10 PRIMARY HYPERTENSION: ICD-10-CM

## 2023-10-30 RX ORDER — METOPROLOL SUCCINATE 100 MG/1
100 TABLET, EXTENDED RELEASE ORAL NIGHTLY
Qty: 30 TABLET | Refills: 2 | Status: SHIPPED | OUTPATIENT
Start: 2023-10-30

## 2023-10-30 NOTE — TELEPHONE ENCOUNTER
Future Appointments    Encounter Information    Provider Department Appt Notes   11/6/2023 Thong Byrnes MD [facility] Return in about 3 months (around 11/3/2023) for for review of outcome of today's recommendation.      Past Visits    Date Provider Specialty Visit Type Primary Dx   08/03/2023 Thong Byrnes MD Family Medicine Office Visit Intractable migraine without status migrainosus, unspecified migraine type

## 2023-12-11 DIAGNOSIS — R51.9 ACUTE NONINTRACTABLE HEADACHE, UNSPECIFIED HEADACHE TYPE: ICD-10-CM

## 2023-12-11 RX ORDER — SUMATRIPTAN 50 MG/1
TABLET, FILM COATED ORAL
Qty: 9 TABLET | Refills: 0 | Status: SHIPPED | OUTPATIENT
Start: 2023-12-11

## 2023-12-11 NOTE — TELEPHONE ENCOUNTER
Future Appointments    This patient does not currently have any appointments scheduled.   Past Visits    Date Provider Specialty Visit Type Primary Dx   08/03/2023 Elliott Brown MD Family Medicine Office Visit Intractable migraine without status migrainosus, unspecified migraine type

## 2024-01-17 DIAGNOSIS — R51.9 ACUTE NONINTRACTABLE HEADACHE, UNSPECIFIED HEADACHE TYPE: ICD-10-CM

## 2024-01-17 RX ORDER — SUMATRIPTAN 50 MG/1
TABLET, FILM COATED ORAL
Qty: 9 TABLET | Refills: 0 | Status: SHIPPED | OUTPATIENT
Start: 2024-01-17

## 2024-01-17 NOTE — TELEPHONE ENCOUNTER
Future Appointments LMTCB & schedule     This patient does not currently have any appointments scheduled.  Past Visits    Date Provider Specialty Visit Type Primary Dx   08/03/2023 Gregor Kirk MD Family Medicine Office Visit Intractable migraine without status migrainosus, unspecified migraine type

## 2024-02-12 DIAGNOSIS — I10 PRIMARY HYPERTENSION: ICD-10-CM

## 2024-02-12 DIAGNOSIS — R51.9 ACUTE NONINTRACTABLE HEADACHE, UNSPECIFIED HEADACHE TYPE: ICD-10-CM

## 2024-02-12 RX ORDER — METOPROLOL SUCCINATE 100 MG/1
100 TABLET, EXTENDED RELEASE ORAL NIGHTLY
Qty: 30 TABLET | Refills: 0 | Status: SHIPPED | OUTPATIENT
Start: 2024-02-12

## 2024-02-12 RX ORDER — SUMATRIPTAN 50 MG/1
TABLET, FILM COATED ORAL
Qty: 9 TABLET | Refills: 0 | Status: SHIPPED | OUTPATIENT
Start: 2024-02-12

## 2024-02-12 NOTE — TELEPHONE ENCOUNTER
Future Appointments     NO answer VM FULL     This patient does not currently have any appointments scheduled.  Past Visits    Date Provider Specialty Visit Type Primary Dx   08/03/2023 Gregor Kirk MD Family Medicine Office Visit Intractable migraine without status migrainosus, unspecified migraine type

## 2024-03-15 DIAGNOSIS — R51.9 ACUTE NONINTRACTABLE HEADACHE, UNSPECIFIED HEADACHE TYPE: ICD-10-CM

## 2024-03-15 DIAGNOSIS — I10 PRIMARY HYPERTENSION: ICD-10-CM

## 2024-03-15 RX ORDER — SUMATRIPTAN 50 MG/1
TABLET, FILM COATED ORAL
Qty: 9 TABLET | Refills: 0 | Status: SHIPPED | OUTPATIENT
Start: 2024-03-15

## 2024-03-15 RX ORDER — METOPROLOL SUCCINATE 100 MG/1
100 TABLET, EXTENDED RELEASE ORAL NIGHTLY
Qty: 30 TABLET | Refills: 0 | Status: SHIPPED | OUTPATIENT
Start: 2024-03-15

## 2024-03-15 NOTE — TELEPHONE ENCOUNTER
Comments: Attempted to call pt to make an appt. VM full.     Last Office Visit (last PCP visit):   08/03/2023    Next Visit Date:  No future appointments.    **If hasn't been seen in over a year OR hasn't followed up according to last diabetes/ADHD visit, make appointment for patient before sending refill to provider.    Rx requested:  Requested Prescriptions     Pending Prescriptions Disp Refills    SUMAtriptan (IMITREX) 50 MG tablet [Pharmacy Med Name: sumatriptan 50 mg tablet] 9 tablet 0     Sig: TAKE 1 TABLET BY MOUTH at the onset OF a migraine, may repeat dose in 2 (TWO) hours if headache is not completely resolved    metoprolol succinate (TOPROL XL) 100 MG extended release tablet [Pharmacy Med Name: metoprolol succinate  mg tablet,extended release 24 hr] 30 tablet 0     Sig: TAKE 1 TABLET BY MOUTH NIGHTLY

## 2024-04-15 DIAGNOSIS — I10 PRIMARY HYPERTENSION: ICD-10-CM

## 2024-04-15 DIAGNOSIS — R51.9 ACUTE NONINTRACTABLE HEADACHE, UNSPECIFIED HEADACHE TYPE: ICD-10-CM

## 2024-04-16 NOTE — TELEPHONE ENCOUNTER
Comments: lov- 8/3/23 lm to cb for f/u     Last Office Visit (last PCP visit):   Visit date not found    Next Visit Date:  No future appointments.    **If hasn't been seen in over a year OR hasn't followed up according to last diabetes/ADHD visit, make appointment for patient before sending refill to provider.    Rx requested:  Requested Prescriptions     Pending Prescriptions Disp Refills    metoprolol succinate (TOPROL XL) 100 MG extended release tablet [Pharmacy Med Name: metoprolol succinate  mg tablet,extended release 24 hr] 30 tablet 0     Sig: TAKE 1 TABLET BY MOUTH NIGHTLY    SUMAtriptan (IMITREX) 50 MG tablet [Pharmacy Med Name: sumatriptan 50 mg tablet] 9 tablet 0     Sig: TAKE 1 TABLET BY MOUTH at the onset OF a migraine, may repeat dose in 2 (TWO) hours if headache is not completely resolved

## 2024-04-17 RX ORDER — METOPROLOL SUCCINATE 100 MG/1
100 TABLET, EXTENDED RELEASE ORAL NIGHTLY
Qty: 30 TABLET | Refills: 0 | Status: SHIPPED | OUTPATIENT
Start: 2024-04-17

## 2024-04-17 RX ORDER — SUMATRIPTAN 50 MG/1
TABLET, FILM COATED ORAL
Qty: 9 TABLET | Refills: 0 | Status: SHIPPED | OUTPATIENT
Start: 2024-04-17

## 2024-05-17 DIAGNOSIS — R51.9 ACUTE NONINTRACTABLE HEADACHE, UNSPECIFIED HEADACHE TYPE: ICD-10-CM

## 2024-05-17 DIAGNOSIS — I10 PRIMARY HYPERTENSION: ICD-10-CM

## 2024-05-17 NOTE — TELEPHONE ENCOUNTER
LMTCB & schedule     Future Appointments    This patient does not currently have any appointments scheduled.  Past Visits    Date Provider Specialty Visit Type Primary Dx   08/03/2023 Gregor Kirk MD Family Medicine Office Visit Intractable migraine without status migrainosus, unspecified migraine type

## 2024-05-20 RX ORDER — METOPROLOL SUCCINATE 100 MG/1
100 TABLET, EXTENDED RELEASE ORAL NIGHTLY
Qty: 30 TABLET | Refills: 0 | Status: SHIPPED | OUTPATIENT
Start: 2024-05-20

## 2024-05-20 RX ORDER — SUMATRIPTAN 50 MG/1
TABLET, FILM COATED ORAL
Qty: 9 TABLET | Refills: 0 | Status: SHIPPED | OUTPATIENT
Start: 2024-05-20

## 2024-06-09 DIAGNOSIS — R51.9 ACUTE NONINTRACTABLE HEADACHE, UNSPECIFIED HEADACHE TYPE: ICD-10-CM

## 2024-06-09 DIAGNOSIS — I10 PRIMARY HYPERTENSION: ICD-10-CM

## 2024-06-10 RX ORDER — METOPROLOL SUCCINATE 100 MG/1
100 TABLET, EXTENDED RELEASE ORAL NIGHTLY
Qty: 30 TABLET | Refills: 0 | OUTPATIENT
Start: 2024-06-10

## 2024-06-10 RX ORDER — SUMATRIPTAN 50 MG/1
TABLET, FILM COATED ORAL
Qty: 9 TABLET | Refills: 0 | OUTPATIENT
Start: 2024-06-10

## 2024-06-20 DIAGNOSIS — R51.9 ACUTE NONINTRACTABLE HEADACHE, UNSPECIFIED HEADACHE TYPE: ICD-10-CM

## 2024-06-20 DIAGNOSIS — I10 PRIMARY HYPERTENSION: ICD-10-CM

## 2024-06-20 RX ORDER — METOPROLOL SUCCINATE 100 MG/1
100 TABLET, EXTENDED RELEASE ORAL NIGHTLY
Qty: 30 TABLET | Refills: 0 | OUTPATIENT
Start: 2024-06-20

## 2024-06-20 RX ORDER — SUMATRIPTAN 50 MG/1
TABLET, FILM COATED ORAL
Qty: 9 TABLET | Refills: 0 | OUTPATIENT
Start: 2024-06-20

## 2024-06-20 NOTE — TELEPHONE ENCOUNTER
Future Appointments  LMTCB & schedule   Return in about 3 months (around 11/3/2023) for for review of outcome of today's recommendation.     This patient does not currently have any appointments scheduled.  Past Visits    Date Provider Specialty Visit Type Primary Dx   08/03/2023 Gregor Kirk MD Family Medicine Office Visit Intractable migraine without status migrainosus, unspecified migraine type   07/06/2023 Gregor Kirk MD Family Medicine Office Visit Snoring   03/08/2023 Gregor Kirk MD Family Medicine Office Visit Nasal congestion   03/01/2023 Gregor Kirk MD Family Medicine Office Visit Primary hypertension

## 2024-06-29 DIAGNOSIS — I10 PRIMARY HYPERTENSION: ICD-10-CM

## 2024-07-01 RX ORDER — METOPROLOL SUCCINATE 100 MG/1
100 TABLET, EXTENDED RELEASE ORAL NIGHTLY
Qty: 30 TABLET | Refills: 0 | OUTPATIENT
Start: 2024-07-01

## 2024-07-01 NOTE — TELEPHONE ENCOUNTER
Comments: previously refused. Notified pharmacy of appt needed to fill     Last Office Visit (last PCP visit):   8/3/2023    Next Visit Date:  No future appointments.    **If hasn't been seen in over a year OR hasn't followed up according to last diabetes/ADHD visit, make appointment for patient before sending refill to provider.    Rx requested:  Requested Prescriptions     Refused Prescriptions Disp Refills    metoprolol succinate (TOPROL XL) 100 MG extended release tablet [Pharmacy Med Name: metoprolol succinate  mg tablet,extended release 24 hr] 30 tablet 0     Sig: TAKE 1 TABLET BY MOUTH NIGHTLY